# Patient Record
Sex: MALE | Race: WHITE | URBAN - METROPOLITAN AREA
[De-identification: names, ages, dates, MRNs, and addresses within clinical notes are randomized per-mention and may not be internally consistent; named-entity substitution may affect disease eponyms.]

---

## 2018-11-15 ENCOUNTER — HOSPITAL ENCOUNTER (OUTPATIENT)
Dept: LAB | Age: 53
Discharge: HOME | End: 2018-11-15

## 2025-04-24 ENCOUNTER — OFFICE VISIT (OUTPATIENT)
Age: 60
End: 2025-04-24

## 2025-04-24 VITALS
SYSTOLIC BLOOD PRESSURE: 126 MMHG | BODY MASS INDEX: 26.71 KG/M2 | DIASTOLIC BLOOD PRESSURE: 83 MMHG | TEMPERATURE: 98.5 F | HEIGHT: 70 IN | OXYGEN SATURATION: 96 % | WEIGHT: 186.6 LBS | HEART RATE: 65 BPM | RESPIRATION RATE: 18 BRPM

## 2025-04-24 DIAGNOSIS — H00.025 HORDEOLUM INTERNUM OF LEFT LOWER EYELID: Primary | ICD-10-CM

## 2025-04-24 RX ORDER — ERYTHROMYCIN 5 MG/G
OINTMENT OPHTHALMIC EVERY 6 HOURS
Qty: 3.5 G | Refills: 0 | Status: SHIPPED | OUTPATIENT
Start: 2025-04-24 | End: 2025-05-04

## 2025-04-24 ASSESSMENT — VISUAL ACUITY: OU: 1

## 2025-04-24 NOTE — PATIENT INSTRUCTIONS
Use erythromycin ointment as prescribed.  May use warm compress to eye.  May take OTC Tylenol or ibuprofen for pain.  See attached care handout for care instructions.  Follow-up with ophthalmologist if symptoms do not resolve.  Go to the ED for any acute or worsening symptoms.

## 2025-04-24 NOTE — PROGRESS NOTES
2025   Everardo Stahl (: 1965) is a 59 y.o. male, New patient, here for evaluation of the following chief complaint(s):  Foreign Body in Eye (Pt states he has some stuck in left eye for about 2 weeks. Pt was doing yard work and got something in eye.)     ASSESSMENT/PLAN:  Below is the assessment and plan developed based on review of pertinent history, physical exam, labs, studies, and medications.  1. Hordeolum internum of left lower eyelid  -     erythromycin (ROMYCIN) 5 MG/GM ophthalmic ointment; Place into the left eye every 6 hours for 10 days Use 1/2 inch ribbon, Left Eye, EVERY 6 HOURS Starting Thu 2025, Until Sun 2025, For 10 days, Disp-3.5 g, R-0, Normal    Patient has a fleshy nodule noted to inner left lower eyelid which appears to be consistent with a hordeolum.  No fluorescein uptake noted during eye exam today.  Ordered erythromycin ointment.  Patient instructed on proper administration technique for eye ointment.  Advised that he may use warm compress to eye.  May take OTC Tylenol or ibuprofen for pain. Advised to follow-up with ophthalmologist if symptoms do not resolve or improve within 24-48 hours.  Go to the ED for any acute or worsening symptoms.  Handout given with care instructions  2. Follow up with PCP as needed.  3. Go to ED with development of any acute symptoms.     Follow up:    Go to the ED immediately for any acute or worsening symptoms.  Follow-up with ophthalmologist if symptoms do not improve within the next 24 to 48 hours.     SUBJECTIVE/OBJECTIVE:  59-year-old patient here today with complaint of feeling as though he may have something in his left eye.  Patient reports that he has had that sensation for about 2 weeks.  He is unsure if he has any foreign object in the eye.  Denies any drainage from eye or crusting of eyelashes in the mornings.  Denies any fever.  Denies any change in vision.  Patient reports that he does not wear contact lenses or